# Patient Record
Sex: MALE | Race: WHITE | HISPANIC OR LATINO | URBAN - NONMETROPOLITAN AREA
[De-identification: names, ages, dates, MRNs, and addresses within clinical notes are randomized per-mention and may not be internally consistent; named-entity substitution may affect disease eponyms.]

---
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
## 2019-08-08 PROBLEM — F33.9 MAJOR DEPRESSIVE DISORDER, RECURRENT, UNSPECIFIED (HCC): Status: ACTIVE | Noted: 2019-08-08

## 2019-08-08 NOTE — ED NOTES
Pt updated on plan of care, awaiting transfer to [facility]. Verbalized understanding.

## 2019-08-08 NOTE — ED PROVIDER NOTES
Patient Seen in: [facility] AND [facility] Emergency Department    History   Patient presents with:  Eval-P (psychiatric)    Stated Complaint:     HPI    64-year-old male who denies previous history presents for psychiatric evaluation.   EMS called for suicidal i No rash noted. Psychiatric: He has a normal mood and affect. Calm, cooperative   Nursing note and vitals reviewed.           ED Course     Labs Reviewed   BASIC METABOLIC PANEL (8) - Abnormal; Notable for the following components:       Result Value facility. Disposition and Plan     Clinical Impression:  Suicidal ideation  (primary encounter diagnosis)    Disposition:  Psychiatric transfer  8/8/2019 12:07 pm    Follow-up:  No follow-up provider specified.       Medications Prescribed:  There are

## 2019-08-08 NOTE — ED NOTES
Manoj De León is accepted to [facility] by Dr. Wilberto Lo going to [id number]-Rm. [id number].  Can give report to Christopher Shell [phone]

## 2019-08-08 NOTE — BH LEVEL OF CARE ASSESSMENT
Level of Care Assessment Note    General Questions  Why are you here?: \"I was mad at my mom and just did not want to be in the house and took some time away.  I did not do anything crazy, I just did not want to go home last night so stayed out and did not sitting when they woke up but refused to speak to them.  Mother waited until father left for work and then called police and ex-girlfriend texted mother sharing that he texted her that he has taken multiple pills and posted goodbye messages on social medica wanting someone harmed or killed further back than 30 days?: No  Current or Past Harm Toward Animals: No  History or Allegations of Inappropriate Physical Contact: No  Have you ever damaged/destroyed property or thought about it?: No    Access to [employer] at first use?: 17  Route: Smoked  Average current amount used? : 1-2g each use and uses a few times a week to relax  How long with this pattern of use?: since age 14  Last Use?: yesterday  Longest period of sobriety/abstinence?: unknown  Is your current us the severity of his statements and does not think they are of concern  Judgment: Poor  Fair/poor judgment as evidenced by: PT texted multiple people last night stating he waas going to end his life.    Thought Patterns  Clarity/Relevance: Coherent  Flow: Or Treatment: No  Transferred: No    Primary Psychiatric Diagnosis  Depressive Disorders: Unspecified Depressive Disorder                               SRAT Review  Behavioral Precautions: Suicide  Medical Precautions: None

## 2019-08-08 NOTE — ED NOTES
Received pt by ems, ambulatory to ED 25 for c/o psych eval.  States that he was brought today after his mom found texts he sent to his ex girlfriend stating that he wanted to take a bunch of pills. Today pt denies si/hi.   States that he sent the texts out

## 2019-08-13 ENCOUNTER — PATIENT OUTREACH (OUTPATIENT)
Dept: CASE MANAGEMENT | Age: 19
End: 2019-08-13

## 2019-08-13 NOTE — PROGRESS NOTES
Called the only phone listed for patient [phone]. I spoke with patient briefly, explaining the reason for my call. The call was either disconnected or he hung up on me. I tried calling back. There was no answer and his voice mail was full.

## 2019-12-03 ENCOUNTER — HOSPITAL ENCOUNTER (EMERGENCY)
Facility: HOSPITAL | Age: 19
Discharge: HOME | End: 2019-12-03
Payer: COMMERCIAL

## 2019-12-03 VITALS
HEART RATE: 70 BPM | RESPIRATION RATE: 18 BRPM | OXYGEN SATURATION: 100 % | TEMPERATURE: 99 F | DIASTOLIC BLOOD PRESSURE: 71 MMHG | WEIGHT: 123.44 LBS | SYSTOLIC BLOOD PRESSURE: 121 MMHG

## 2019-12-03 DIAGNOSIS — N34.2 URETHRITIS: Primary | ICD-10-CM

## 2019-12-03 PROCEDURE — 87591 N.GONORRHOEAE DNA AMP PROB: CPT | Performed by: NURSE PRACTITIONER

## 2019-12-03 PROCEDURE — 87086 URINE CULTURE/COLONY COUNT: CPT | Performed by: NURSE PRACTITIONER

## 2019-12-03 PROCEDURE — 87491 CHLMYD TRACH DNA AMP PROBE: CPT | Performed by: NURSE PRACTITIONER

## 2019-12-03 PROCEDURE — 99284 EMERGENCY DEPT VISIT MOD MDM: CPT

## 2019-12-03 PROCEDURE — 96372 THER/PROPH/DIAG INJ SC/IM: CPT

## 2019-12-03 PROCEDURE — 81001 URINALYSIS AUTO W/SCOPE: CPT | Performed by: NURSE PRACTITIONER

## 2019-12-03 RX ORDER — AZITHROMYCIN 250 MG/1
1000 TABLET, FILM COATED ORAL ONCE
Status: COMPLETED | OUTPATIENT
Start: 2019-12-03 | End: 2019-12-03

## 2019-12-03 NOTE — ED INITIAL ASSESSMENT (HPI)
AOX3 AMBULATORY TO ED PT CO OF DYSURIA WITH ABNORMAL DISCHARGE X 2 WEEKS. STATES LAST UNPROTECTED SEX WAS IN July.

## 2019-12-04 NOTE — ED PROVIDER NOTES
Patient Seen in: [facility] AND [facility] Emergency Department      History   Patient presents with:  Urinary Symptoms  STD    Stated Complaint:     HPI    28-year-old male presents to the emergency department with his mother complaining of dysuria intermittent Urobilinogen Urine 2.0 (*)     Leukocyte Esterase Urine Moderate (*)     WBC Urine 88 (*)     WBC Clump Few (*)     RBC URINE 12 (*)     Bacteria Urine Few (*)     All other components within normal limits   CHLAMYDIA/GONOCOCCUS, ARNEL   URINE CULTURE, RO

## 2019-12-06 ENCOUNTER — TELEPHONE (OUTPATIENT)
Dept: FAMILY MEDICINE CLINIC | Facility: CLINIC | Age: 19
End: 2019-12-06

## 2019-12-06 NOTE — TELEPHONE ENCOUNTER
Patient is to be seen in 3 months, appointment scheduled for 02/20/20 and was aware of the results already.

## 2019-12-25 ENCOUNTER — APPOINTMENT (OUTPATIENT)
Dept: GENERAL RADIOLOGY | Facility: HOSPITAL | Age: 19
End: 2019-12-25
Attending: EMERGENCY MEDICINE
Payer: COMMERCIAL

## 2019-12-25 ENCOUNTER — HOSPITAL ENCOUNTER (EMERGENCY)
Facility: HOSPITAL | Age: 19
Discharge: HOME | End: 2019-12-25
Attending: EMERGENCY MEDICINE
Payer: COMMERCIAL

## 2019-12-25 VITALS
OXYGEN SATURATION: 98 % | WEIGHT: 145 LBS | BODY MASS INDEX: 21.48 KG/M2 | RESPIRATION RATE: 13 BRPM | TEMPERATURE: 100 F | DIASTOLIC BLOOD PRESSURE: 70 MMHG | HEART RATE: 94 BPM | HEIGHT: 69 IN | SYSTOLIC BLOOD PRESSURE: 128 MMHG

## 2019-12-25 DIAGNOSIS — J02.0 STREP PHARYNGITIS: Primary | ICD-10-CM

## 2019-12-25 PROCEDURE — 71045 X-RAY EXAM CHEST 1 VIEW: CPT | Performed by: EMERGENCY MEDICINE

## 2019-12-25 PROCEDURE — 99285 EMERGENCY DEPT VISIT HI MDM: CPT

## 2019-12-25 PROCEDURE — 84484 ASSAY OF TROPONIN QUANT: CPT | Performed by: EMERGENCY MEDICINE

## 2019-12-25 PROCEDURE — 96361 HYDRATE IV INFUSION ADD-ON: CPT

## 2019-12-25 PROCEDURE — 80048 BASIC METABOLIC PNL TOTAL CA: CPT | Performed by: EMERGENCY MEDICINE

## 2019-12-25 PROCEDURE — 93005 ELECTROCARDIOGRAM TRACING: CPT

## 2019-12-25 PROCEDURE — 85025 COMPLETE CBC W/AUTO DIFF WBC: CPT | Performed by: EMERGENCY MEDICINE

## 2019-12-25 PROCEDURE — 93010 ELECTROCARDIOGRAM REPORT: CPT | Performed by: EMERGENCY MEDICINE

## 2019-12-25 PROCEDURE — 96374 THER/PROPH/DIAG INJ IV PUSH: CPT

## 2019-12-25 PROCEDURE — 87430 STREP A AG IA: CPT

## 2019-12-25 RX ORDER — AMOXICILLIN 500 MG/1
500 TABLET, FILM COATED ORAL 2 TIMES DAILY
Qty: 20 TABLET | Refills: 0 | Status: SHIPPED | OUTPATIENT
Start: 2019-12-25 | End: 2020-01-04

## 2019-12-25 RX ORDER — ACETAMINOPHEN 500 MG
1000 TABLET ORAL ONCE
Status: COMPLETED | OUTPATIENT
Start: 2019-12-25 | End: 2019-12-25

## 2019-12-25 RX ORDER — KETOROLAC TROMETHAMINE 30 MG/ML
30 INJECTION, SOLUTION INTRAMUSCULAR; INTRAVENOUS ONCE
Status: COMPLETED | OUTPATIENT
Start: 2019-12-25 | End: 2019-12-25

## 2019-12-25 RX ORDER — IBUPROFEN 600 MG/1
600 TABLET ORAL ONCE
Status: DISCONTINUED | OUTPATIENT
Start: 2019-12-25 | End: 2019-12-25

## 2019-12-25 NOTE — ED INITIAL ASSESSMENT (HPI)
Pt presents to ED via EMS for palpitations and chest pain that started last night. Pt also complaining of a sore throat and cough.

## 2019-12-25 NOTE — ED PROVIDER NOTES
Patient Seen in: [facility] AND [facility] Emergency Department      History   Patient presents with:  Chest Pain Angina  Cough/URI    Stated Complaint:     HPI    23-year-old male with no significant past medical history here with complaints of palpitations, ch [12/25/19 0626]   /73   Pulse 112   Resp 10   Temp (!) 102.6 °F (39.2 °C)   Temp src Oral   SpO2 100 %   O2 Device None (Room air)       Current:/70   Pulse 98   Temp 99.8 °F (37.7 °C) (Oral)   Resp 21   Ht 175.3 cm (5' 9\")   Wt 65.8 kg   SpO2 105 bpm.     My interpretation is   abnormal for rate   Normal for rhythm    Pulse Oximeter:  Pulse oximetry on room air is 99%, indicating adequate oxygenation.     PROCEDURES:  none    DIAGNOSTICS:   Labs:  Recent Results (from the past 24 hour(s))   MAEVE radiographically evident acute intrathoracic process.     Dictated by (CST): Oscar Osorio MD on 12/25/2019 at 7:12     Approved by (CST): Oscar Osorio MD on 12/25/2019 at 1310 [facility] TREATMENT:  Patient's conditio needed        Medications Prescribed:  Current Discharge Medication List    START taking these medications    Benzocaine-Menthol (CEPACOL SORE THROAT) 10-2.1 MG Mouth/Throat Lozenge  Use as directed 1 lozenge in the mouth or throat 4 (four) times daily as

## 2020-02-07 ENCOUNTER — TELEPHONE (OUTPATIENT)
Dept: FAMILY MEDICINE CLINIC | Facility: CLINIC | Age: 20
End: 2020-02-07

## 2020-02-07 NOTE — TELEPHONE ENCOUNTER
Spoke to mother informed her patient needed to be seen , appointment scheduled for next week Wednesday

## 2020-02-07 NOTE — TELEPHONE ENCOUNTER
Patients mother is calling to speak to a nurse regarding patients health. Ms. Kay Haddad states patient has a lump in both earlobes. Please advise. Stephen Rachel

## 2021-06-12 ENCOUNTER — APPOINTMENT (OUTPATIENT)
Dept: GENERAL RADIOLOGY | Facility: HOSPITAL | Age: 21
End: 2021-06-12
Attending: EMERGENCY MEDICINE
Payer: MEDICAID

## 2021-06-12 ENCOUNTER — HOSPITAL ENCOUNTER (EMERGENCY)
Facility: HOSPITAL | Age: 21
Discharge: HOME | End: 2021-06-12
Attending: EMERGENCY MEDICINE
Payer: MEDICAID

## 2021-06-12 ENCOUNTER — APPOINTMENT (OUTPATIENT)
Dept: CT IMAGING | Facility: HOSPITAL | Age: 21
End: 2021-06-12
Attending: EMERGENCY MEDICINE
Payer: MEDICAID

## 2021-06-12 VITALS
WEIGHT: 150 LBS | SYSTOLIC BLOOD PRESSURE: 118 MMHG | TEMPERATURE: 99 F | DIASTOLIC BLOOD PRESSURE: 71 MMHG | RESPIRATION RATE: 16 BRPM | HEIGHT: 69 IN | HEART RATE: 63 BPM | BODY MASS INDEX: 22.22 KG/M2 | OXYGEN SATURATION: 100 %

## 2021-06-12 DIAGNOSIS — S49.92XA INJURY OF LEFT SHOULDER, INITIAL ENCOUNTER: Primary | ICD-10-CM

## 2021-06-12 PROCEDURE — 99284 EMERGENCY DEPT VISIT MOD MDM: CPT

## 2021-06-12 PROCEDURE — 96372 THER/PROPH/DIAG INJ SC/IM: CPT

## 2021-06-12 PROCEDURE — 73030 X-RAY EXAM OF SHOULDER: CPT | Performed by: EMERGENCY MEDICINE

## 2021-06-12 PROCEDURE — 72072 X-RAY EXAM THORAC SPINE 3VWS: CPT | Performed by: EMERGENCY MEDICINE

## 2021-06-12 PROCEDURE — 73200 CT UPPER EXTREMITY W/O DYE: CPT | Performed by: EMERGENCY MEDICINE

## 2021-06-12 RX ORDER — NAPROXEN 500 MG/1
500 TABLET ORAL 2 TIMES DAILY PRN
Qty: 14 TABLET | Refills: 0 | Status: SHIPPED | OUTPATIENT
Start: 2021-06-12 | End: 2021-06-19

## 2021-06-12 RX ORDER — KETOROLAC TROMETHAMINE 30 MG/ML
30 INJECTION, SOLUTION INTRAMUSCULAR; INTRAVENOUS ONCE
Status: COMPLETED | OUTPATIENT
Start: 2021-06-12 | End: 2021-06-12

## 2021-06-12 NOTE — ED QUICK NOTES
rec'd care of pt from triage, ambulatory. States he was at work and fell. C/o left shoulder and mid spine pain. Denies paraesthesias. Moving all extremities appropriately. +cms to left arm. No deformities noted. Awaiting xr. Pain medication administered.  Diane More

## 2021-06-12 NOTE — ED PROVIDER NOTES
XR THORACIC SPINE (3 VIEWS) (CPT=72072)    Result Date: 6/12/2021  CONCLUSION: Normal examination.      Dictated by (CST): Triston Iqbal MD on 6/12/2021 at 2:50 PM     Finalized by (CST): Triston Iqbal MD on 6/12/2021 at 2:51 PM          XR SHOULDER, COMP

## 2021-06-12 NOTE — ED INITIAL ASSESSMENT (HPI)
Fall at work yesterday. 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 Complaints of left shoulder and neck pain with loss of range of motion.

## 2021-06-12 NOTE — ED PROVIDER NOTES
Patient Seen in: [facility] AND [facility] Emergency Department    History   Patient presents with:  Fall      HPI    18-year-old male presents the ER with complaint of left shoulder injury.   Patient states that he was walking a dog when he fell and landed on his droplet mask, eye protection, and gloves were worn throughout the duration of the exam.  Handwashing was performed prior to and after the exam.  Stethoscope and any equipment used during my examination was cleaned with super sani-cloth germicidal wipes fol SpO2: 100% 99% 100%   Weight: 68 kg     Height: 175.3 cm (5' 9\")       *I personally reviewed and interpreted all ED vitals.   I also personally reviewed all labs and imaging if ordered    Pulse Ox: 100%, Room air, Normal     Monitor Interpretation:

## (undated) NOTE — ED AVS SNAPSHOT
Bayron Jimenez   MRN: [id number]    Department:  [facility] Emergency Department   Date of Visit:  12/3/2019           Disclosure     Insurance plans vary and the physician(s) referred by the ER may not be covered by your plan.  Please CARE PHYSICIAN AT ONCE OR RETURN IMMEDIATELY TO THE EMERGENCY DEPARTMENT. If you have been prescribed any medication(s), please fill your prescription right away and begin taking the medication(s) as directed.   If you believe that any of the medications

## (undated) NOTE — LETTER
Date & Time: 12/25/2019, 8:24 AM  Patient: Bayron Jimenez  Encounter Provider(s): Marry Cheung MD       To Whom It May Concern:    Omar Craig was seen and treated in our department on 12/25/2019. He can come to work on 12/26/2019.

## (undated) NOTE — ED AVS SNAPSHOT
Afshan Sales   MRN: [id number]    Department:  [facility] Emergency Department   Date of Visit:  12/25/2019           Disclosure     Insurance plans vary and the physician(s) referred by the ER may not be covered by your plan.  Crista CARE PHYSICIAN AT ONCE OR RETURN IMMEDIATELY TO THE EMERGENCY DEPARTMENT. If you have been prescribed any medication(s), please fill your prescription right away and begin taking the medication(s) as directed.   If you believe that any of the medications